# Patient Record
Sex: FEMALE | Race: WHITE | Employment: FULL TIME | URBAN - METROPOLITAN AREA
[De-identification: names, ages, dates, MRNs, and addresses within clinical notes are randomized per-mention and may not be internally consistent; named-entity substitution may affect disease eponyms.]

---

## 2021-06-29 ENCOUNTER — APPOINTMENT (OUTPATIENT)
Dept: GENERAL RADIOLOGY | Age: 31
End: 2021-06-29
Attending: PHYSICIAN ASSISTANT
Payer: COMMERCIAL

## 2021-06-29 ENCOUNTER — HOSPITAL ENCOUNTER (OUTPATIENT)
Age: 31
Discharge: HOME | End: 2021-06-29
Payer: COMMERCIAL

## 2021-06-29 VITALS
HEIGHT: 69 IN | TEMPERATURE: 97 F | DIASTOLIC BLOOD PRESSURE: 82 MMHG | RESPIRATION RATE: 17 BRPM | WEIGHT: 225 LBS | SYSTOLIC BLOOD PRESSURE: 140 MMHG | OXYGEN SATURATION: 98 % | HEART RATE: 94 BPM | BODY MASS INDEX: 33.33 KG/M2

## 2021-06-29 DIAGNOSIS — J20.9 ACUTE BRONCHITIS WITH WHEEZING: ICD-10-CM

## 2021-06-29 DIAGNOSIS — J22 LOWER RESPIRATORY TRACT INFECTION: Primary | ICD-10-CM

## 2021-06-29 DIAGNOSIS — R05.9 COUGH: ICD-10-CM

## 2021-06-29 LAB — SARS-COV-2 RNA RESP QL NAA+PROBE: NOT DETECTED

## 2021-06-29 PROCEDURE — 71046 X-RAY EXAM CHEST 2 VIEWS: CPT | Performed by: PHYSICIAN ASSISTANT

## 2021-06-29 PROCEDURE — 99213 OFFICE O/P EST LOW 20 MIN: CPT | Performed by: PHYSICIAN ASSISTANT

## 2021-06-29 PROCEDURE — U0002 COVID-19 LAB TEST NON-CDC: HCPCS | Performed by: PHYSICIAN ASSISTANT

## 2021-06-29 RX ORDER — PREDNISONE 20 MG/1
40 TABLET ORAL DAILY
Qty: 10 TABLET | Refills: 0 | Status: SHIPPED | OUTPATIENT
Start: 2021-06-29 | End: 2021-07-04

## 2021-06-29 RX ORDER — LAMOTRIGINE 150 MG/1
150 TABLET ORAL DAILY
COMMUNITY

## 2021-06-29 RX ORDER — METHYLPREDNISOLONE 4 MG/1
TABLET ORAL
COMMUNITY
End: 2021-06-29 | Stop reason: ALTCHOICE

## 2021-06-29 RX ORDER — ALBUTEROL SULFATE 90 UG/1
AEROSOL, METERED RESPIRATORY (INHALATION)
COMMUNITY
Start: 2021-06-28

## 2021-06-29 RX ORDER — BENZONATATE 100 MG/1
CAPSULE ORAL
COMMUNITY
Start: 2021-06-28 | End: 2021-07-05

## 2021-06-29 RX ORDER — DOXYCYCLINE HYCLATE 100 MG/1
100 CAPSULE ORAL 2 TIMES DAILY
Qty: 20 CAPSULE | Refills: 0 | Status: SHIPPED | OUTPATIENT
Start: 2021-06-29 | End: 2021-07-09

## 2021-06-29 NOTE — ED PROVIDER NOTES
Patient Seen in: Immediate [address]      History   Patient presents with:  Cough: Entered by patient    Stated Complaint: Cough    HPI/Subjective:   HPI    72-year-old female who comes in stating that she started to having sinus pressure and semitransparent, external ear canals normal, both ears   Nose: Nares symmetrical, septum midline, mucosa normal, clear watery discharge; no sinus tenderness   Throat: Lips, tongue, and mucosa are moist, pink, and intact; teeth intact.  No erythema, no exuda report. Patient has now had over 3 weeks of symptoms she has been doing over-the-counter treatment with minimal relief I discussed with the patient acute bronchitis and lower respiratory tract infections.   At this time given over 21 days we will start t understanding of the discharge instructions and plan.

## 2021-06-29 NOTE — ED INITIAL ASSESSMENT (HPI)
3 weeks of non productive cough  Went to [facility] yesterday- Rx'd Albuterol inhaler, Medrol dose pack and Benzonatate

## 2022-04-20 ENCOUNTER — HOSPITAL ENCOUNTER (OUTPATIENT)
Age: 32
Discharge: HOME | End: 2022-04-20
Payer: COMMERCIAL

## 2022-04-20 VITALS
SYSTOLIC BLOOD PRESSURE: 120 MMHG | DIASTOLIC BLOOD PRESSURE: 72 MMHG | HEART RATE: 116 BPM | OXYGEN SATURATION: 98 % | RESPIRATION RATE: 20 BRPM | TEMPERATURE: 98 F

## 2022-04-20 DIAGNOSIS — J40 SINOBRONCHITIS: ICD-10-CM

## 2022-04-20 DIAGNOSIS — Z11.52 ENCOUNTER FOR SCREENING FOR COVID-19: Primary | ICD-10-CM

## 2022-04-20 DIAGNOSIS — J32.9 SINOBRONCHITIS: ICD-10-CM

## 2022-04-20 LAB — SARS-COV-2 RNA RESP QL NAA+PROBE: NOT DETECTED

## 2022-04-20 PROCEDURE — 99213 OFFICE O/P EST LOW 20 MIN: CPT | Performed by: NURSE PRACTITIONER

## 2022-04-20 PROCEDURE — U0002 COVID-19 LAB TEST NON-CDC: HCPCS | Performed by: NURSE PRACTITIONER

## 2022-04-20 RX ORDER — METHYLPREDNISOLONE 4 MG/1
TABLET ORAL
Qty: 21 TABLET | Refills: 0 | Status: SHIPPED | OUTPATIENT
Start: 2022-04-20

## 2022-04-20 RX ORDER — AZITHROMYCIN 250 MG/1
TABLET, FILM COATED ORAL
Qty: 6 TABLET | Refills: 0 | Status: SHIPPED | OUTPATIENT
Start: 2022-04-20

## 2022-04-20 RX ORDER — CODEINE PHOSPHATE AND GUAIFENESIN 10; 100 MG/5ML; MG/5ML
5 SOLUTION ORAL EVERY 6 HOURS PRN
Qty: 180 ML | Refills: 0 | Status: SHIPPED | OUTPATIENT
Start: 2022-04-20

## 2022-04-20 NOTE — ED INITIAL ASSESSMENT (HPI)
Patient reports having a \"head cold\" last week, states it progressed to more severe cough last night coughing for about 6 hours straight. States she is prone to bronchitis. States she has used inhaler a couple of times at home. No current fever. No shortness of breath observed.